# Patient Record
Sex: FEMALE | Race: WHITE | Employment: OTHER | URBAN - METROPOLITAN AREA
[De-identification: names, ages, dates, MRNs, and addresses within clinical notes are randomized per-mention and may not be internally consistent; named-entity substitution may affect disease eponyms.]

---

## 2017-12-04 ENCOUNTER — OFFICE VISIT (OUTPATIENT)
Dept: URGENT CARE | Facility: URGENT CARE | Age: 52
End: 2017-12-04
Payer: COMMERCIAL

## 2017-12-04 VITALS
SYSTOLIC BLOOD PRESSURE: 128 MMHG | TEMPERATURE: 98.5 F | DIASTOLIC BLOOD PRESSURE: 80 MMHG | HEART RATE: 100 BPM | OXYGEN SATURATION: 98 % | WEIGHT: 172 LBS

## 2017-12-04 DIAGNOSIS — B37.31 CANDIDIASIS OF VULVA AND VAGINA: ICD-10-CM

## 2017-12-04 DIAGNOSIS — B96.89 BV (BACTERIAL VAGINOSIS): ICD-10-CM

## 2017-12-04 DIAGNOSIS — N89.8 VAGINAL DISCHARGE: Primary | ICD-10-CM

## 2017-12-04 DIAGNOSIS — N76.0 BV (BACTERIAL VAGINOSIS): ICD-10-CM

## 2017-12-04 LAB
SPECIMEN SOURCE: ABNORMAL
WET PREP SPEC: ABNORMAL

## 2017-12-04 PROCEDURE — 87210 SMEAR WET MOUNT SALINE/INK: CPT | Performed by: PHYSICIAN ASSISTANT

## 2017-12-04 PROCEDURE — 99203 OFFICE O/P NEW LOW 30 MIN: CPT | Performed by: PHYSICIAN ASSISTANT

## 2017-12-04 RX ORDER — FLUCONAZOLE 150 MG/1
TABLET ORAL
Qty: 2 TABLET | Refills: 0 | Status: SHIPPED | OUTPATIENT
Start: 2017-12-04

## 2017-12-04 RX ORDER — METRONIDAZOLE 500 MG/1
500 TABLET ORAL 2 TIMES DAILY
Qty: 14 TABLET | Refills: 0 | Status: SHIPPED | OUTPATIENT
Start: 2017-12-04

## 2017-12-04 NOTE — MR AVS SNAPSHOT
"              After Visit Summary   2017    Lani Lara    MRN: [id number]           Patient Information     Date Of Birth          1965        Visit Information        Provider Department      2017 8:00 PM Addie Damico PA-C [facility]        Today's Diagnoses     Vaginal discharge    -  1    Candidiasis of vulva and vagina        BV (bacterial vaginosis)           Follow-ups after your visit        Who to contact     If you have questions or need follow up information about today's clinic visit or your schedule please contact [facility] directly at [phone].  Normal or non-critical lab and imaging results will be communicated to you by [employer], letter or phone within 4 business days after the clinic has received the results. If you do not hear from us within 7 days, please contact the clinic through [employer] or phone. If you have a critical or abnormal lab result, we will notify you by phone as soon as possible.  Submit refill requests through [employer] or call your pharmacy and they will forward the refill request to us. Please allow 3 business days for your refill to be completed.          Additional Information About Your Visit        MyChart Information     [employer] lets you send messages to your doctor, view your test results, renew your prescriptions, schedule appointments and more. To sign up, go to [email or web address] . Click on \"Log in\" on the left side of the screen, which will take you to the Welcome page. Then click on \"Sign up Now\" on the right side of the page.     You will be asked to enter the access code listed below, as well as some personal information. Please follow the directions to create your username and password.     Your access code is: [email or web address]  Expires: 3/4/2018  8:56 PM     Your access code will  in 90 days. If you need help or a new code, please call your [facility] or [phone].        Care " EveryWhere ID     This is your Care EveryWhere ID. This could be used by other organizations to access your [place] medical records  [id number]        Your Vitals Were     Pulse Temperature Pulse Oximetry             100 98.5  F (36.9  C) (Oral) 98%          Blood Pressure from Last 3 Encounters:   12/04/17 128/80    Weight from Last 3 Encounters:   12/04/17 172 lb (78 kg)              We Performed the Following     Wet prep          Today's Medication Changes          These changes are accurate as of: 12/4/17  8:56 PM.  If you have any questions, ask your nurse or doctor.               Start taking these medicines.        Dose/Directions    fluconazole 150 MG tablet   Commonly known as:  DIFLUCAN   Used for:  Candidiasis of vulva and vagina   Started by:  Addie Damico PA-C        One po now, repeat in 7 days if still with symptoms.   Quantity:  2 tablet   Refills:  0       metroNIDAZOLE 500 MG tablet   Commonly known as:  FLAGYL   Used for:  BV (bacterial vaginosis)   Started by:  Addie Damico PA-C        Dose:  500 mg   Take 1 tablet (500 mg) by mouth 2 times daily   Quantity:  14 tablet   Refills:  0            Where to get your medicines      [id number] [address] [facility]  [address] [address]    Hours:  24-hours Phone:  [phone]     fluconazole 150 MG tablet    metroNIDAZOLE 500 MG tablet                Primary Care Provider Office Phone # Fax #    [facility] [phone]       [address]  [place] [address]        Equal Access to Services     [facility]: Hadii aad ku hadasho Soomaali, waaxda luqadaha, qaybta kaalmada adeegyada, rojelio devries. So [facility] [phone].    ATENCIÓN: Si habla español, tiene a sow disposición servicios gratuitos de asistencia lingüística. Llame al [phone].    We  comply with applicable federal civil rights laws and [place] laws. We do not discriminate on the basis of race, color, national origin, age, disability, sex, sexual orientation, or gender identity.            Thank you!     Thank you for choosing [facility]  for your care. Our goal is always to provide you with excellent care. Hearing back from our patients is one way we can continue to improve our services. Please take a few minutes to complete the written survey that you may receive in the mail after your visit with us. Thank you!             Your Updated Medication List - Protect others around you: Learn how to safely use, store and throw away your medicines at www.disposemymeds.org.          This list is accurate as of: 12/4/17  8:56 PM.  Always use your most recent med list.                   Brand Name Dispense Instructions for use Diagnosis    fluconazole 150 MG tablet    DIFLUCAN    2 tablet    One po now, repeat in 7 days if still with symptoms.    Candidiasis of vulva and vagina       metroNIDAZOLE 500 MG tablet    FLAGYL    14 tablet    Take 1 tablet (500 mg) by mouth 2 times daily    BV (bacterial vaginosis)

## 2017-12-05 NOTE — NURSING NOTE
Chief Complaint   Patient presents with     Vaginal Problem     Patient complains of yeast infection       Initial /80 (BP Location: Left arm, Patient Position: Chair, Cuff Size: Adult Regular)  Pulse 100  Temp 98.5  F (36.9  C) (Oral)  Wt 172 lb (78 kg)  SpO2 98% There is no height or weight on file to calculate BMI.  Medication Reconciliation: complete         Tina Fields

## 2017-12-05 NOTE — PROGRESS NOTES
SUBJECTIVE:   Lani Lara is a 52 year old female who presents to clinic today for the following health issues:      Vaginal Symptoms      Duration: 3.5 weeks    Description  pain with intercourse, pain when wiping    Intensity:  moderate    Accompanying signs and symptoms (fever/dysuria/abdominal or back pain): None    History  Sexually active: yes,   Possibility of pregnancy: No  Recent antibiotic use: yes    Precipitating or alleviating factors: None    Therapies tried and outcome: antibiotics   Outcome:None     11/4/2017 seen at [facility]- given Flagyl and Cipro for diverticulitis. Also given one Diflucan in case yeast infection developed. Took yeast pill 11/7, no help. Tried OTC Monistat, burned. LMP August, menopausal. No urinary urgency or frequency. Is not diabetic, states she has hypoglycemia.        Allergies   Allergen Reactions     Magnesium Shortness Of Breath     Vicodin [Hydrocodone-Acetaminophen] Nausea and Vomiting       No past medical history on file.      No current outpatient prescriptions on file prior to visit.  No current facility-administered medications on file prior to visit.     Social History   Substance Use Topics     Smoking status: Not on file     Smokeless tobacco: Not on file     Alcohol use Not on file       ROS:  General: negative for fever  ABD: Denies abd pain  : as above    OBJECTIVE:  There were no vitals taken for this visit.   General:   awake, alert, and cooperative.  NAD.   Head: Normocephalic, atraumatic.  Eyes: Conjunctiva clear, non icteric.   ABD: soft, no tenderness to palpation , no rigidity, guarding or rebound . No CVAT  Neuro: Alert and oriented - normal speech.   Vaginal area with redness and inflammation. Skin almost friable introital opening. Vaginal vault with yellow DC, wet prep obtained  Results for orders placed or performed in visit on 12/04/17   Wet prep   Result Value Ref Range    Specimen Description Vagina     Wet Prep No Trichomonas seen     Wet  Prep Clue cells seen (A)     Wet Prep Yeast seen (A)          ASSESSMENT:    ICD-10-CM    1. Vaginal discharge N89.8 Wet prep   2. Candidiasis of vulva and vagina B37.3 fluconazole (DIFLUCAN) 150 MG tablet   3. BV (bacterial vaginosis) N76.0 metroNIDAZOLE (FLAGYL) 500 MG tablet    B96.89          PLAN:   As per ordered above. F/U PCP prn.      Addie Damico PA-C

## 2020-09-23 ENCOUNTER — APPOINTMENT (OUTPATIENT)
Dept: URBAN - METROPOLITAN AREA CLINIC 252 | Age: 55
Setting detail: DERMATOLOGY
End: 2020-09-23

## 2020-09-23 DIAGNOSIS — L82.1 OTHER SEBORRHEIC KERATOSIS: ICD-10-CM

## 2020-09-23 DIAGNOSIS — L81.4 OTHER MELANIN HYPERPIGMENTATION: ICD-10-CM

## 2020-09-23 DIAGNOSIS — D22 MELANOCYTIC NEVI: ICD-10-CM

## 2020-09-23 PROBLEM — D22.5 MELANOCYTIC NEVI OF TRUNK: Status: ACTIVE | Noted: 2020-09-23

## 2020-09-23 PROCEDURE — OTHER COUNSELING: OTHER

## 2020-09-23 PROCEDURE — 99203 OFFICE O/P NEW LOW 30 MIN: CPT

## 2020-09-23 ASSESSMENT — LOCATION ZONE DERM
LOCATION ZONE: ARM
LOCATION ZONE: TRUNK

## 2020-09-23 ASSESSMENT — LOCATION SIMPLE DESCRIPTION DERM
LOCATION SIMPLE: RIGHT FOREARM
LOCATION SIMPLE: RIGHT UPPER BACK
LOCATION SIMPLE: LEFT UPPER BACK

## 2020-09-23 ASSESSMENT — LOCATION DETAILED DESCRIPTION DERM
LOCATION DETAILED: RIGHT MID-UPPER BACK
LOCATION DETAILED: LEFT MID-UPPER BACK
LOCATION DETAILED: RIGHT DISTAL RADIAL DORSAL FOREARM
LOCATION DETAILED: LEFT SUPERIOR UPPER BACK

## 2021-06-18 ENCOUNTER — THERAPY VISIT (OUTPATIENT)
Dept: PHYSICAL THERAPY | Facility: CLINIC | Age: 56
End: 2021-06-18
Payer: COMMERCIAL

## 2021-06-18 DIAGNOSIS — M25.552 HIP PAIN, LEFT: ICD-10-CM

## 2021-06-18 DIAGNOSIS — M53.3 DISORDER OF SACRUM: ICD-10-CM

## 2021-06-18 PROCEDURE — 97110 THERAPEUTIC EXERCISES: CPT | Mod: GP | Performed by: PHYSICAL THERAPIST

## 2021-06-18 PROCEDURE — 97140 MANUAL THERAPY 1/> REGIONS: CPT | Mod: GP | Performed by: PHYSICAL THERAPIST

## 2021-06-18 PROCEDURE — 97161 PT EVAL LOW COMPLEX 20 MIN: CPT | Mod: GP | Performed by: PHYSICAL THERAPIST

## 2021-06-18 ASSESSMENT — ACTIVITIES OF DAILY LIVING (ADL)
WALKING_DOWN_STEEP_HILLS: EXTREME DIFFICULTY
ROLLING_OVER_IN_BED: EXTREME DIFFICULTY
HOS_ADL_COUNT: 17
SITTING_FOR_15_MINUTES: NO DIFFICULTY AT ALL
WALKING_APPROXIMATELY_10_MINUTES: MODERATE DIFFICULTY
TWISTING/PIVOTING_ON_INVOLVED_LEG: EXTREME DIFFICULTY
HOS_ADL_HIGHEST_POTENTIAL_SCORE: 68
HOS_ADL_ITEM_SCORE_TOTAL: 24
RECREATIONAL_ACTIVITIES: EXTREME DIFFICULTY
HEAVY_WORK: EXTREME DIFFICULTY
DEEP_SQUATTING: EXTREME DIFFICULTY
LIGHT_TO_MODERATE_WORK: MODERATE DIFFICULTY
HOS_ADL_SCORE(%): 35.29
GOING_UP_1_FLIGHT_OF_STAIRS: EXTREME DIFFICULTY
WALKING_15_MINUTES_OR_GREATER: EXTREME DIFFICULTY
STANDING_FOR_15_MINUTES: MODERATE DIFFICULTY
HOW_WOULD_YOU_RATE_YOUR_CURRENT_LEVEL_OF_FUNCTION_DURING_YOUR_USUAL_ACTIVITIES_OF_DAILY_LIVING_FROM_0_TO_100_WITH_100_BEING_YOUR_LEVEL_OF_FUNCTION_PRIOR_TO_YOUR_HIP_PROBLEM_AND_0_BEING_THE_INABILITY_TO_PERFORM_ANY_OF_YOUR_USUAL_DAILY_ACTIVITIES?: 50
PUTTING_ON_SOCKS_AND_SHOES: SLIGHT DIFFICULTY
WALKING_INITIALLY: MODERATE DIFFICULTY
GETTING_INTO_AND_OUT_OF_AN_AVERAGE_CAR: MODERATE DIFFICULTY
WALKING_UP_STEEP_HILLS: EXTREME DIFFICULTY
GETTING_INTO_AND_OUT_OF_A_BATHTUB: EXTREME DIFFICULTY
STEPPING_UP_AND_DOWN_CURBS: SLIGHT DIFFICULTY
GOING_DOWN_1_FLIGHT_OF_STAIRS: EXTREME DIFFICULTY

## 2021-06-18 NOTE — PROGRESS NOTES
Physical Therapy Initial Evaluation  Subjective:  The history is provided by the patient.   Patient Health History  Lani Lara being seen for PT for SIJ.     Problem began: 4/28/2021 (orthopedic consult).   Problem occurred: getting older   Pain is reported as 5/10 on pain scale.  General health as reported by patient is good.  Pertinent medical history includes: overweight. Other medical history details: hypoglycemic.   Red flags:  Significant weakness and pain at rest/night.  Medical allergies: none.   Surgeries include:  Heart surgery. Other surgery history details: ablation.    Current medications:  None.    Current occupation is .   Primary job tasks include:  Computer work and prolonged sitting.                  Therapist Generated HPI Evaluation  Problem details: Pt notes that her pain is not in the groin, but rather on the upper L gluteal area near the SI joint. She has had this pain for the past 5 months. She does note a remote history of LB issues 5 yr ago (DDD). She has had an MRI of her L hip which showed a tear in the anterior/superior labrum.  .         Type of problem:  Sacroiliac and lumbar.    This is a chronic condition.  Condition occurred with:  Insidious onset.  Where condition occurred: for unknown reasons.  Patient reports pain:  SI joint left.  Pain is described as aching and sharp and is constant.  Pain radiates to:  No radiation. Pain is the same all the time.  Since onset symptoms are gradually worsening.  Associated symptoms:  Loss of motion/stiffness. Symptoms are exacerbated by bending, lying down, twisting, sitting, walking, standing and certain positions (laying on R side>laying on L side; laying flat on back; stairs; bending/squatting)  and relieved by ice, rest and analgesics (alcohol).  Special tests included:  MRI and x-ray (MRI on hip; xray of Lspine).  Previous treatment includes physical therapy and other (PT in 2010; cortisone injection in L hip last  week). Improved with treatment: moderate for PT; none for injection.  Restrictions due to condition include:  Working in normal job without restrictions.  Barriers include:  None as reported by patient.                        Objective:    Gait:    Gait Type:  Normal   Assistive Devices:  None                 Lumbar/SI Evaluation  ROM:    AROM Lumbar:   Flexion:          40% of NL w/pain++  Ext:                    50% w/pain   Side Bend:        Left:  WNL    Right:  WNL  Rotation:           Left:     Right:   Side Glide:        Left:     Right:           Lumbar Myotomes:  not assessed            Lumbar DTR's:  not assessed        Lumbar Dermtomes:  not assessed                Neural Tension/Mobility:  Lumbar:  Normal        Lumbar Palpation:    Tenderness present at Left:    Erector Spinae; Piriformis; PSIS; Gluteus Medius and Vertebral  Tenderness not present at Left:    Iliac Crest or Greater Trochanter  Tenderness present at Right: Erector Spinae; Piriformis; Gluteus Medius and Vertebral  Tenderness not present at Right:  PSIS; Iliac Crest or Greater Trochanter      Spinal Segmental Conclusions: Mild to moderate restrictions w/PA glides at L3 to S1, pain worst at L3.           SI joint/Sacrum:    Standing: positive fwd bend w/L PSIS not moving and painful (also does not go deeper w/lumbar ext, also painful); negative Jonas's bilat'ly.  Supine: positive SLR and GUICHO on L for her pain (neg for both on R).  Prone: positive POEs w/L sacral sulcus more shallow than R in prone and stayed shallow w/POEs; L NURY inferior.    Pt presenting with a L unilat'ly extended sacrum. After STM and MET performed to correct the obliquity, pt had neg POEs test and able to fwd bend in standing to >50% of NL w/less pain.                                                         Haile Lumbar Evaluation    Posture:  Sitting: fair  Standing: fair  Lordosis: Accentuated  Lateral Shift: no  Correction of Posture: no effect                                                    ROS    Assessment/Plan:    Patient is a 55 year old female with lumbar and sacral complaints.    Patient has the following significant findings with corresponding treatment plan.                Diagnosis 1:  L hip/LBP w/L SIJ dysfunction  Pain -  hot/cold therapy, US, electric stimulation, self management, education, directional preference exercise and home program  Decreased ROM/flexibility - manual therapy and therapeutic exercise  Decreased joint mobility - manual therapy and therapeutic exercise  Decreased strength - therapeutic exercise and therapeutic activities  Impaired muscle performance - neuro re-education  Decreased function - therapeutic activities  Impaired posture - neuro re-education and therapeutic activities    Therapy Evaluation Codes:   1) History comprised of:   Personal factors that impact the plan of care:      Past/current experiences and Time since onset of symptoms.    Comorbidity factors that impact the plan of care are:      Overweight, Pain at night/rest and Weakness.     Medications impacting care: None.  2) Examination of Body Systems comprised of:   Body structures and functions that impact the plan of care:      Hip, Lumbar spine and Sacral illiac joint.   Activity limitations that impact the plan of care are:      Bending, Dressing, Sitting, Squatting/kneeling, Stairs, Standing, Walking, Sleeping and Laying down.  3) Clinical presentation characteristics are:   Stable/Uncomplicated.  4) Decision-Making    Low complexity using standardized patient assessment instrument and/or measureable assessment of functional outcome.  Cumulative Therapy Evaluation is: Low complexity.    Previous and current functional limitations:  (See Goal Flow Sheet for this information)    Short term and Long term goals: (See Goal Flow Sheet for this information)     Communication ability:  Patient appears to be able to clearly communicate and understand verbal and written  communication and follow directions correctly.  Treatment Explanation - The following has been discussed with the patient:   RX ordered/plan of care  Anticipated outcomes  Possible risks and side effects  This patient would benefit from PT intervention to resume normal activities.   Rehab potential is good.    Frequency:  1 X week, once daily  Duration:  for 6 weeks  Discharge Plan:  Achieve all LTG.  Independent in home treatment program.  Reach maximal therapeutic benefit.    Please refer to the daily flowsheet for treatment today, total treatment time and time spent performing 1:1 timed codes.

## 2021-06-24 ENCOUNTER — THERAPY VISIT (OUTPATIENT)
Dept: PHYSICAL THERAPY | Facility: CLINIC | Age: 56
End: 2021-06-24
Payer: COMMERCIAL

## 2021-06-24 DIAGNOSIS — M25.552 HIP PAIN, LEFT: ICD-10-CM

## 2021-06-24 DIAGNOSIS — M53.3 DISORDER OF SACRUM: ICD-10-CM

## 2021-06-24 PROCEDURE — 97140 MANUAL THERAPY 1/> REGIONS: CPT | Mod: GP | Performed by: PHYSICAL THERAPIST

## 2021-06-24 PROCEDURE — 97110 THERAPEUTIC EXERCISES: CPT | Mod: GP | Performed by: PHYSICAL THERAPIST

## 2021-06-28 ENCOUNTER — THERAPY VISIT (OUTPATIENT)
Dept: PHYSICAL THERAPY | Facility: CLINIC | Age: 56
End: 2021-06-28
Payer: COMMERCIAL

## 2021-06-28 DIAGNOSIS — M25.552 HIP PAIN, LEFT: ICD-10-CM

## 2021-06-28 DIAGNOSIS — M53.3 DISORDER OF SACRUM: ICD-10-CM

## 2021-06-28 PROCEDURE — 97110 THERAPEUTIC EXERCISES: CPT | Mod: GP | Performed by: PHYSICAL THERAPIST

## 2021-06-28 PROCEDURE — 97140 MANUAL THERAPY 1/> REGIONS: CPT | Mod: GP | Performed by: PHYSICAL THERAPIST

## 2021-07-08 ENCOUNTER — THERAPY VISIT (OUTPATIENT)
Dept: PHYSICAL THERAPY | Facility: CLINIC | Age: 56
End: 2021-07-08
Payer: COMMERCIAL

## 2021-07-08 DIAGNOSIS — M53.3 DISORDER OF SACRUM: ICD-10-CM

## 2021-07-08 DIAGNOSIS — M25.552 HIP PAIN, LEFT: ICD-10-CM

## 2021-07-08 PROCEDURE — 97530 THERAPEUTIC ACTIVITIES: CPT | Mod: GP | Performed by: PHYSICAL THERAPIST

## 2021-07-08 PROCEDURE — 97110 THERAPEUTIC EXERCISES: CPT | Mod: GP | Performed by: PHYSICAL THERAPIST

## 2021-07-08 PROCEDURE — 97112 NEUROMUSCULAR REEDUCATION: CPT | Mod: GP | Performed by: PHYSICAL THERAPIST

## 2021-07-08 NOTE — LETTER
[facility]  [address]  [facility] [address]  [phone]    2021    Re: Lani Lara   :   1965  MRN:  [id number]   REFERRING PHYSICIAN:   David F Labadie M [facility]    Date of Initial Evaluation:  2021  Visits:  Rxs Used: 4  Reason for Referral:     Disorder of sacrum  Hip pain, left    EVALUATION SUMMARY    Subjective:  HPI  Physical Exam                  Objective:  System  Physical Exam  General   ROS    Assessment/Plan:    DISCHARGE REPORT  Progress reporting period is from 21 to 21.       SUBJECTIVE  Subjective changes noted by patient:  Pt notes that her SIJ went out again almost right away after her last session d/t having to go up/down stairs several times.    Current pain level is 5/10.     Previous pain level was  5/10  .   Changes in function:  None  Adverse reaction to treatment or activity: None    OBJECTIVE  Changes noted in objective findings:    SIJ reassessed: standing, L PSIS not moving w/forward bend but pt also compensating w/movement;  prone, negative in prone for alignment and both sacral sulci go into ext equally. Non tender to palpation on either SIJ line, but light PA glides at L2 through L5 were very painful.     ASSESSMENT/PLAN  Updated problem list and treatment plan: Diagnosis 1:  LBP w/SIJ dysfunction and lumbar DDD/DJD  Pain -  home program  Decreased ROM/flexibility - home program  Lani Constantinorell   :   1965    Decreased joint mobility - home program  Decreased strength - home program  Impaired muscle performance - home program  Decreased function - home program  Impaired posture - home program  STG/LTGs have been met or progress has been made towards goals:  None  Assessment of Progress: The patient's condition is unchanged.  Self Management Plans:  Patient has been instructed in a home treatment program.  Patient is independent in  a home treatment program.  Patient  has been instructed in self management of symptoms.  Patient is independent in self management of symptoms.  I have re-evaluated this patient and find that the nature, scope, duration and intensity of the therapy is appropriate for the medical condition of the patient.  Lani continues to require the following intervention to meet STG and LTG's:  PT intervention is no longer required to meet STG/LTG. and patient is moving out of state and will seek out PT when in [place].    Recommendations:  This patient is ready to be discharged from therapy and continue their home treatment program.    Thank you for your referral.    INQUIRIES   Therapist: Nichelle Reid PT. [facility] SERVICES [facility]  [address]  [facility] [address]  Phone: [phone]  Fax: [phone]

## 2021-07-08 NOTE — PROGRESS NOTES
Subjective:  HPI  Physical Exam                    Objective:  System    Physical Exam    General     ROS    Assessment/Plan:    DISCHARGE REPORT    Progress reporting period is from 6/18/21 to 7/8/21.       SUBJECTIVE  Subjective changes noted by patient:  Pt notes that her SIJ went out again almost right away after her last session d/t having to go up/down stairs several times.    Current pain level is 5/10.     Previous pain level was  5/10  .   Changes in function:  None  Adverse reaction to treatment or activity: None    OBJECTIVE  Changes noted in objective findings:    SIJ reassessed: standing, L PSIS not moving w/forward bend but pt also compensating w/movement;  prone, negative in prone for alignment and both sacral sulci go into ext equally. Non tender to palpation on either SIJ line, but light PA glides at L2 through L5 were very painful.     ASSESSMENT/PLAN  Updated problem list and treatment plan: Diagnosis 1:  LBP w/SIJ dysfunction and lumbar DDD/DJD  Pain -  home program  Decreased ROM/flexibility - home program  Decreased joint mobility - home program  Decreased strength - home program  Impaired muscle performance - home program  Decreased function - home program  Impaired posture - home program  STG/LTGs have been met or progress has been made towards goals:  None  Assessment of Progress: The patient's condition is unchanged.  Self Management Plans:  Patient has been instructed in a home treatment program.  Patient is independent in a home treatment program.  Patient  has been instructed in self management of symptoms.  Patient is independent in self management of symptoms.  I have re-evaluated this patient and find that the nature, scope, duration and intensity of the therapy is appropriate for the medical condition of the patient.  Lani continues to require the following intervention to meet STG and LTG's:  PT intervention is no longer required to meet STG/LTG. and patient is moving out of state  and will seek out PT when in [place].    Recommendations:  This patient is ready to be discharged from therapy and continue their home treatment program.    Please refer to the daily flowsheet for treatment today, total treatment time and time spent performing 1:1 timed codes.